# Patient Record
Sex: FEMALE | Race: BLACK OR AFRICAN AMERICAN
[De-identification: names, ages, dates, MRNs, and addresses within clinical notes are randomized per-mention and may not be internally consistent; named-entity substitution may affect disease eponyms.]

---

## 2017-03-30 ENCOUNTER — HOSPITAL ENCOUNTER (EMERGENCY)
Dept: HOSPITAL 62 - ER | Age: 32
Discharge: HOME | End: 2017-03-30
Payer: SELF-PAY

## 2017-03-30 VITALS — DIASTOLIC BLOOD PRESSURE: 87 MMHG | SYSTOLIC BLOOD PRESSURE: 126 MMHG

## 2017-03-30 DIAGNOSIS — K08.89: ICD-10-CM

## 2017-03-30 DIAGNOSIS — K02.9: Primary | ICD-10-CM

## 2017-03-30 PROCEDURE — 99282 EMERGENCY DEPT VISIT SF MDM: CPT

## 2017-03-30 NOTE — ER DOCUMENT REPORT
ED Medical Screen (RME)





- General


Stated Complaint: TOOTH PAIN


Time seen by provider: 19:29


Mode of Arrival: Ambulatory


Information source: Patient


Notes: 


32-year-old female presents to ED for toothache since Tuesday.  States she's 

does not have a dentist appointment until the 14th.  Her whole left side of her 

face hurts up to her ear and feels like it is swollen.  Last menstrual period 03 /04/2017.  States she took 2 Tylenol around 4 or 5:00 this afternoon they she 

has not taken any ibuprofen.

















I have greeted and performed a rapid initial assessment of this patient.  A 

comprehensive ED assessment and evaluation of the patient, analysis of test 

results and completion of medical decision making process will be conducted by 

an additional ED providers.

## 2017-03-30 NOTE — ER DOCUMENT REPORT
HPI





- HPI


Patient complains to provider of: dental pain


Onset: Other - 2 days


Onset/Duration: Persistent


Quality of pain: Achy


Pain Level: 5


Context: 


Patient reports 2 day history of dental pain to left upper jaw that radiates to 

her ear.  Patient denies any fever or facial swelling.  Patient does report 

having a dental appointment in 2 weeks.


Associated Symptoms: Earache - Dental pain, Other.  denies: Fever, Headache


Exacerbated by: Denies


Relieved by: Denies


Similar symptoms previously: Yes


Recently seen / treated by doctor: No





- ROS


ROS below otherwise negative: Yes


Systems Reviewed and Negative: Yes All other systems reviewed and negative





- CONSTITUTIONAL


Constitutional: DENIES: Fever





- EENT


EENT: REPORTS: Ear Pain





- NEURO


Neurology: DENIES: Headache





- RESPIRATORY


Respiratory: DENIES: Coughing





- GASTROINTESTINAL


Gastrointestinal: DENIES: Nausea, Patient vomiting





- REPRODUCTIVE


LMP: 3-04-17





- MUSCULOSKELETAL


Musculoskeletal: DENIES: Back Pain, Neck Pain





- DERM


Skin Color: Normal


Skin Problems: None





Past Medical History





- General


Information source: Patient





- Social History


Smoking Status: Never Smoker


Chew tobacco use (# tins/day): No


Frequency of alcohol use: None


Drug Abuse: None


Occupation: retail


Lives with: Family


Family History: Reviewed & Not Pertinent


Patient has suicidal ideation: No


Patient has homicidal ideation: No





- Medical History


Medical History: Negative


Renal/ Medical History: Denies: Hx Peritoneal Dialysis


Surgical Hx: Negative





Vertical Provider Document





- CONSTITUTIONAL


Agree With Documented VS: Yes


Exam Limitations: No Limitations


General Appearance: WD/WN, No Apparent Distress





- INFECTION CONTROL


TRAVEL OUTSIDE OF THE [place] IN LAST 30 DAYS: No





- HEENT


HEENT: Atraumatic, Normocephalic.  negative: Pharyngeal Exudate, Pharyngeal 

Tenderness, Pharyngeal Erythema, Tympanic Membrane Red, Tympanic Membrane 

Bulging


Mouth Diagram: 


  __________________________














  __________________________





 1 - Dental decay, fracture, no gingival abscess.  No trismus





Notes: 


No mastoid tenderness or swelling





- NECK


Neck: Normal Inspection, Supple.  negative: Lymphadenopathy-Left, 

Lymphadenopathy-Right





- RESPIRATORY


Respiratory: Breath Sounds Normal, No Respiratory Distress


O2 Sat by Pulse Oximetry: 100





- CARDIOVASCULAR


Cardiovascular: Regular Rate, Regular Rhythm, No Murmur





- BACK


Back: Normal Inspection





- MUSCULOSKELETAL/EXTREMETIES


Musculoskeletal/Extremeties: MAEW





- NEURO


Level of Consciousness: Awake, Alert, Appropriate


Motor/Sensory: No Motor Deficit





- DERM


Integumentary: Warm, Dry, No Rash





Course





- Vital Signs


Vital signs: 


 











Temp Pulse Resp BP Pulse Ox


 


 98.2 F   82   18   126/87 H  100 


 


 03/30/17 19:28  03/30/17 19:28  03/30/17 19:28  03/30/17 19:28  03/30/17 19:28














Discharge





- Discharge


Clinical Impression: 


 Toothache





Condition: Stable


Disposition: HOME, SELF-CARE


Instructions:  Toothache (OM), Penicillin V K (OM), Oral Narcotic Medication (

OM)


Additional Instructions: 


Return immediately for any new or worsening symptoms





Followup with your primary care provider, call tomorrow to make a followup 

appointment





Follow-up with your dentist as planned


Prescriptions: 


Hydrocodone/Acetaminophen [Norco 5-325 Tablet] 1 each PO Q4 PRN #10 tablet


 PRN Reason: 


Naproxen [Naprosyn 250 Nmg Tablet] 1 tab PO BID #14 tablet


Penicillin V Potassium [Penicillin Vk 500 mg Tablet] 500 mg PO BID #20 tablet


Referrals: 


[facility] [Provider Group] - Follow up as needed

## 2017-04-08 ENCOUNTER — HOSPITAL ENCOUNTER (EMERGENCY)
Dept: HOSPITAL 62 - ER | Age: 32
Discharge: HOME | End: 2017-04-08
Payer: SELF-PAY

## 2017-04-08 VITALS — SYSTOLIC BLOOD PRESSURE: 111 MMHG | DIASTOLIC BLOOD PRESSURE: 68 MMHG

## 2017-04-08 DIAGNOSIS — K02.9: ICD-10-CM

## 2017-04-08 DIAGNOSIS — H92.01: Primary | ICD-10-CM

## 2017-04-08 PROCEDURE — 99282 EMERGENCY DEPT VISIT SF MDM: CPT

## 2017-04-08 NOTE — ER DOCUMENT REPORT
ED Medical Screen (RME)





- General


Stated Complaint: EAR PAIN


Mode of Arrival: Ambulatory


Information source: Patient


Notes: 


Patient presents with complaints of right ear pain.


TRAVEL OUTSIDE OF THE [place] IN LAST 30 DAYS: No





- Related Data


Allergies/Adverse Reactions: 


 





No Known Allergies Allergy (Unverified 03/30/17 19:31)


 











Past Medical History


Renal/ Medical History: Denies: Hx Peritoneal Dialysis

## 2017-04-08 NOTE — ER DOCUMENT REPORT
ED General





- General


Chief Complaint: Ear Pain


Stated Complaint: EAR PAIN


Mode of Arrival: Ambulatory


Notes: 


Patient is a 32-year-old female presents for complaint of right ear pain.  

Patient says pain has been intermittent for last 24 hours.  She does have 

dental caries and is due to have some teeth removed on the 13th.  She is 

currently and antibiotics for her teeth.  She denies any recent runny nose 

cough or congestion.  No fevers.  No facial swelling.  No trauma to the ear.  

No discharge or blood from the ear.


TRAVEL OUTSIDE OF THE [place] IN LAST 30 DAYS: No





- Related Data


Allergies/Adverse Reactions: 


 





No Known Allergies Allergy (Unverified 03/30/17 19:31)


 











Past Medical History





- General


Information source: Patient





- Social History


Smoking Status: Unknown if Ever Smoked


Frequency of alcohol use: None


Drug Abuse: None


Family History: Reviewed & Not Pertinent


Patient has suicidal ideation: No


Patient has homicidal ideation: No


Renal/ Medical History: Denies: Hx Peritoneal Dialysis





Review of Systems





- Review of Systems


Notes: 


My Normal Review Basic





REVIEW OF SYSTEMS:


CONSTITUTIONAL :  Denies fever,  chills, or sweats.  Denies recent illness.


EENT: Ear pain


GASTROINTESTINAL:   Denies nausea, vomiting, or diarrhea.  Denies constipation.

  Last BM: 


SKIN:   Denies rash or skin lesions.


NEUROLOGICAL:  Denies altered mental status or loss of consciousness.  Denies 

headache.  Denies weakness or paralysis or loss of use of either side.  Denies 

problems with gait or speech.  Denies sensory or motor loss.


ALL OTHER SYSTEMS REVIEWED AND NEGATIVE.





Physical Exam





- Vital signs


Vitals: 


 











Temp Pulse Resp BP Pulse Ox


 


 98.1 F   77   18   124/81   100 


 


 04/08/17 21:49  04/08/17 21:49  04/08/17 21:49  04/08/17 21:49  04/08/17 21:49














- Notes


Notes: 


General Appearance: Well nourished, alert, cooperative, no acute distress, no 

obvious discomfort.  Well-appearing.


Vitals: reviewed, See vital signs table.


Head: no swelling or tenderness to the head.  Facial swelling.


Eyes: PERRL, EOMI, Conjuctiva clear


Mouth: No decreasd moisture.  Multiple dental caries bilaterally.  No gingival 

swelling or signs of gingival abscess.


Throat: No tonsillar inflammation, No airway obstruction,  No lymphadenopathy


Ears: No muscle pain right hepatic membrane.  Normal external ear.  Left 

external ear and tympanic membrane are normal appearing as well.


Neck: Supple, no neck tenderness,


Skin: warm, dry, appropriate color, no rash


Neuro: speech clear, oriented x 3, normal affect, responds appropriately to 

questions.





Course





- Vital Signs


Vital signs: 


 











Temp Pulse Resp BP Pulse Ox


 


 98.1 F   77   18   124/81   100 


 


 04/08/17 21:49  04/08/17 21:49  04/08/17 21:49  04/08/17 21:49  04/08/17 21:49














- Transfer of Care


Notes: 





04/08/17 23:11


Patient is in sinus infection or swelling or redness to the ear.  I suspect is 

probably referred pain from her poor dentition.  Patient has an appointment 

with the dentist and the 13th.  She has no evidence of facial abscess.  Shows 

no swelling.  No fevers.  She looks well.  Patient will be discharged home.  She

's encouraged to continue take anti-inflammatory medications for her pain.  She'

s encouraged follow-up with her dentist on the 13th.  She's encouraged to 

return to ER immediately if she has worsening pain, any facial swelling, any 

redness, or fevers.  Patient agrees with plan and will be discharged home.





Dictation of this chart was performed using voice recognition software; 

therefore, there may be some unintended grammatical errors.





Discharge





- Discharge


Clinical Impression: 


 Ear pain, right, Pain, dental





Condition: Good


Disposition: HOME, SELF-CARE


Additional Instructions: 


Please follow up with your dentist on the 13th as scheduled. please return to 

the ER immediately if you have worsening pain, facial swelling, fevers, or feel 

unwell.

## 2018-01-23 ENCOUNTER — HOSPITAL ENCOUNTER (EMERGENCY)
Dept: HOSPITAL 62 - ER | Age: 33
Discharge: LEFT BEFORE DISCHARGE | End: 2018-01-23
Payer: SELF-PAY

## 2018-01-23 VITALS — DIASTOLIC BLOOD PRESSURE: 71 MMHG | SYSTOLIC BLOOD PRESSURE: 107 MMHG

## 2018-01-23 DIAGNOSIS — Z53.21: Primary | ICD-10-CM

## 2018-01-23 DIAGNOSIS — J02.9: ICD-10-CM

## 2018-01-23 PROCEDURE — 87880 STREP A ASSAY W/OPTIC: CPT

## 2018-01-23 PROCEDURE — 87077 CULTURE AEROBIC IDENTIFY: CPT

## 2018-01-23 PROCEDURE — 87070 CULTURE OTHR SPECIMN AEROBIC: CPT
